# Patient Record
Sex: FEMALE | Race: WHITE | Employment: OTHER | URBAN - METROPOLITAN AREA
[De-identification: names, ages, dates, MRNs, and addresses within clinical notes are randomized per-mention and may not be internally consistent; named-entity substitution may affect disease eponyms.]

---

## 2022-03-08 ENCOUNTER — ANESTHESIA EVENT (OUTPATIENT)
Dept: ENDOSCOPY | Age: 82
End: 2022-03-08
Payer: MEDICARE

## 2022-03-09 ENCOUNTER — HOSPITAL ENCOUNTER (OUTPATIENT)
Age: 82
Setting detail: OUTPATIENT SURGERY
Discharge: HOME | End: 2022-03-09
Attending: INTERNAL MEDICINE | Admitting: INTERNAL MEDICINE
Payer: MEDICARE

## 2022-03-09 ENCOUNTER — ANESTHESIA (OUTPATIENT)
Dept: ENDOSCOPY | Age: 82
End: 2022-03-09
Payer: MEDICARE

## 2022-03-09 VITALS
TEMPERATURE: 97.7 F | RESPIRATION RATE: 16 BRPM | BODY MASS INDEX: 35.85 KG/M2 | DIASTOLIC BLOOD PRESSURE: 61 MMHG | HEART RATE: 83 BPM | HEIGHT: 64 IN | WEIGHT: 210 LBS | SYSTOLIC BLOOD PRESSURE: 101 MMHG | OXYGEN SATURATION: 95 %

## 2022-03-09 LAB
POTASSIUM BLD-SCNC: 6.3 MMOL/L (ref 3.5–5.1)
POTASSIUM SERPL-SCNC: 5.4 MMOL/L (ref 3.5–5.1)

## 2022-03-09 PROCEDURE — 84132 ASSAY OF SERUM POTASSIUM: CPT

## 2022-03-09 PROCEDURE — 74011000250 HC RX REV CODE- 250: Performed by: STUDENT IN AN ORGANIZED HEALTH CARE EDUCATION/TRAINING PROGRAM

## 2022-03-09 PROCEDURE — 2709999900 HC NON-CHARGEABLE SUPPLY: Performed by: INTERNAL MEDICINE

## 2022-03-09 PROCEDURE — 77030021593 HC FCPS BIOP ENDOSC BSC -A: Performed by: INTERNAL MEDICINE

## 2022-03-09 PROCEDURE — 74011250636 HC RX REV CODE- 250/636: Performed by: ANESTHESIOLOGY

## 2022-03-09 PROCEDURE — 76040000025: Performed by: INTERNAL MEDICINE

## 2022-03-09 PROCEDURE — 88312 SPECIAL STAINS GROUP 1: CPT

## 2022-03-09 PROCEDURE — 74011250636 HC RX REV CODE- 250/636: Performed by: STUDENT IN AN ORGANIZED HEALTH CARE EDUCATION/TRAINING PROGRAM

## 2022-03-09 PROCEDURE — 76060000031 HC ANESTHESIA FIRST 0.5 HR: Performed by: INTERNAL MEDICINE

## 2022-03-09 PROCEDURE — 88305 TISSUE EXAM BY PATHOLOGIST: CPT

## 2022-03-09 PROCEDURE — C1726 CATH, BAL DIL, NON-VASCULAR: HCPCS | Performed by: INTERNAL MEDICINE

## 2022-03-09 RX ORDER — SODIUM CHLORIDE, SODIUM LACTATE, POTASSIUM CHLORIDE, CALCIUM CHLORIDE 600; 310; 30; 20 MG/100ML; MG/100ML; MG/100ML; MG/100ML
100 INJECTION, SOLUTION INTRAVENOUS CONTINUOUS
Status: DISCONTINUED | OUTPATIENT
Start: 2022-03-09 | End: 2022-03-09 | Stop reason: HOSPADM

## 2022-03-09 RX ORDER — SODIUM CHLORIDE, SODIUM LACTATE, POTASSIUM CHLORIDE, CALCIUM CHLORIDE 600; 310; 30; 20 MG/100ML; MG/100ML; MG/100ML; MG/100ML
25 INJECTION, SOLUTION INTRAVENOUS CONTINUOUS
Status: DISCONTINUED | OUTPATIENT
Start: 2022-03-09 | End: 2022-03-09 | Stop reason: HOSPADM

## 2022-03-09 RX ORDER — LIDOCAINE HYDROCHLORIDE 20 MG/ML
INJECTION, SOLUTION EPIDURAL; INFILTRATION; INTRACAUDAL; PERINEURAL AS NEEDED
Status: DISCONTINUED | OUTPATIENT
Start: 2022-03-09 | End: 2022-03-09 | Stop reason: HOSPADM

## 2022-03-09 RX ORDER — ASPIRIN 81 MG/1
81 TABLET ORAL DAILY
COMMUNITY

## 2022-03-09 RX ORDER — PROPOFOL 10 MG/ML
INJECTION, EMULSION INTRAVENOUS AS NEEDED
Status: DISCONTINUED | OUTPATIENT
Start: 2022-03-09 | End: 2022-03-09 | Stop reason: HOSPADM

## 2022-03-09 RX ADMIN — SODIUM CHLORIDE, SODIUM LACTATE, POTASSIUM CHLORIDE, AND CALCIUM CHLORIDE: 600; 310; 30; 20 INJECTION, SOLUTION INTRAVENOUS at 11:32

## 2022-03-09 RX ADMIN — PROPOFOL 50 MG: 10 INJECTION, EMULSION INTRAVENOUS at 11:37

## 2022-03-09 RX ADMIN — PROPOFOL 20 MG: 10 INJECTION, EMULSION INTRAVENOUS at 11:43

## 2022-03-09 RX ADMIN — PROPOFOL 30 MG: 10 INJECTION, EMULSION INTRAVENOUS at 11:39

## 2022-03-09 RX ADMIN — LIDOCAINE HYDROCHLORIDE 100 MG: 20 INJECTION, SOLUTION EPIDURAL; INFILTRATION; INTRACAUDAL; PERINEURAL at 11:37

## 2022-03-09 NOTE — H&P
History and Physical for Procedure             Date: 3/9/2022     History of Present Illness:  Patient presents to undergo EGD. Past Medical History:   Diagnosis Date    A[facility]fib ([facility] [address]     Asthma     Hypertension     Stroke ([facility] [address]     TIA 2021, no residual      Past Surgical History:   Procedure Laterality Date    HX CHOLECYSTECTOMY      HX ORTHOPAEDIC      Broken ankle with hardware      In and  No family history on file. Social History     Tobacco Use    Smoking status: Never Smoker    Smokeless tobacco: Never Used   Substance Use Topics    Alcohol use: Not Currently        Allergies   Allergen Reactions    Pcn [Penicillins] Rash     No current facility-administered medications for this encounter. Current Outpatient Medications   Medication Sig    hydrALAZINE (APRESOLINE) 25 mg tablet Take 25 mg by mouth daily.  furosemide (Lasix) 20 mg tablet Take 20 mg by mouth daily.  spironolactone (ALDACTONE) 25 mg tablet Take 25 mg by mouth daily.  DULoxetine (Cymbalta) 60 mg capsule Take 60 mg by mouth daily.  apixaban (Eliquis) 5 mg tablet Take 5 mg by mouth two (2) times a day.  esomeprazole (NexIUM) 40 mg capsule Take  by mouth daily.  montelukast (Singulair) 10 mg tablet Take 10 mg by mouth daily.  levothyroxine (SYNTHROID) 112 mcg tablet Take  by mouth Daily (before breakfast).  metoprolol tartrate (LOPRESSOR) 25 mg tablet Take 25 mg by mouth two (2) times a day.  nitrofurantoin (Macrodantin) 100 mg capsule Take 50 mg by mouth two (2) times a day. Review of Systems:  A detailed 10 organ review of systems is obtained with pertinent positives as listed in the History of Present Illness. All others are negative. Objective:     Physical Exam:  There were no vitals taken for this visit. General:  Alert and oriented.   Heart: Regular rate and rhythm  Lungs:  Clear to auscultation bilaterally  Abdomen: Soft, nontender, nondistended    Impression/Plan:     Proceed with EGD as planned. I have discussed with the patient the technique, benefits, alternatives, and risks of these procedures, including medication reaction, immediate or delayed bleeding, or perforation of the gastrointestinal tract.      Signed By: Lucy New MD     March 9, 2022

## 2022-03-09 NOTE — PROGRESS NOTES
K+ on EPOC machine revealed a 6.3. Dr. Lurdes Lagunas notified and staff is to re draw a potassium and send to lab.

## 2022-03-09 NOTE — OP NOTES
Procedure:  Esophagogastroduodenoscopy    Date of Procedure:  3/9/2022    Patient:  Marjorie Escobar     1940    Indication:  Dysphagia, GERD     Sedation:  MAC    Pre-Procedure Physical Exam:    Mental status:  alert and oriented  Airway:  normal oropharyngeal airway and neck mobility  CV:  regular rate and rhythm  Respiratory:  clear to auscultation    Procedure:  A History and Physical has been performed, and patient medication allergies have been reviewed. Risks of perforation, hemorrhage, adverse drug reaction, and aspiration were discussed. Informed consent was obtained for the procedure, including sedation. The patient was placed in the left lateral decubitus position. The heart rate, oxygen saturations, blood pressure, and response to care were monitored throughout the procedure. The gastroscope was passed through the mouth and advanced under direct vision to the second portion of the duodenum. A careful inspection was made as the gastroscope was withdrawn, including a retroflexed view of the proximal stomach. The patient tolerated the procedure well. Findings:     OROPHARYNX: Cords and pyriform recesses appear normal.   ESOPHAGUS: Esophageal dyskinesia is seen. Empiric dilation was performed using a CRE dilator to maximum enter diameter of 20 mm. No evidence of mucosal disruption. STOMACH: On retroflexion, the flap-valve is classified as Grade IV, with no tissue fold present at the lesser curvature, an open esophagus, and significant axial displacement of the squamocolumnar junction. A sliding-type hiatal hernia is seen with axial height of 3 cm and approximate transverse width of 3 cm. Mild erythematous gastritis was seen in the antrum. Biopsies of the antrum and body were obtained for H pylori. DUODENUM: The bulb and second portions are normal.    Specimen:  Yes    Estimated Blood Loss:  Minimal    Implant:  None           Impression:    Esophageal dyskinesia.  Empiric dilation was performed. Hiatal hernia. Gastritis. Plan:  1. Soft diet today. 2. Advance diet tomorrow as tolerated. 3. Omeprazole 40 mg daily. 4. Avoid NSAIDs and hold anticoagulation for 48 hours. 5. Follow-up pathology results. 6. Return to office in 2-3 months.      Signed:  Karly Jimenez MD  3/9/2022  11:48 AM

## 2022-03-09 NOTE — ANESTHESIA PREPROCEDURE EVALUATION
Relevant Problems   No relevant active problems       Anesthetic History               Review of Systems / Medical History  Patient summary reviewed and pertinent labs reviewed    Pulmonary            Asthma        Neuro/Psych       CVA       Cardiovascular    Hypertension        Dysrhythmias : atrial fibrillation  CAD    Exercise tolerance: <4 METS     GI/Hepatic/Renal                Endo/Other        Obesity     Other Findings   Comments: Eliquis 3/7         Physical Exam    Airway  Mallampati: II  TM Distance: > 6 cm  Neck ROM: normal range of motion   Mouth opening: Normal     Cardiovascular    Rhythm: irregular  Rate: normal         Dental    Dentition: Full lower dentures and Full upper dentures     Pulmonary    Rhonchi:bibasilar             Abdominal         Other Findings            Anesthetic Plan    ASA: 3  Anesthesia type: total IV anesthesia            Anesthetic plan and risks discussed with: Patient      K pending

## 2022-03-09 NOTE — DISCHARGE INSTRUCTIONS
Gastrointestinal Esophagogastroduodenoscopy (EGD) - Upper Exam Discharge Instructions    1. Call Dr. Rosina Valenzuela at [phone] for any problems or questions. 2. Contact the doctor's office for follow up appointment as directed. 3. Medication may cause drowsiness for several hours, therefore:  · Do not drive or operate machinery for remainder of the day. · No alcohol today. · Do not make any important or legal decisions for 24 hours. · Do not sign any legal documents for 24 hours. 5. Ordinarily, you may resume regular diet and activity after exam unless otherwise specified by your physician. 6. For mild soreness in your throat you may use Cepacol throat lozenges or warm salt-water gargles as needed.     Any additional instructions:  Dr. Shirley Qiu office will call with pathology results and follow up appointment  May resume Eliquis Friday 3/11/22

## 2022-03-09 NOTE — ANESTHESIA POSTPROCEDURE EVALUATION
Procedure(s):  ESOPHAGOGASTRODUODENOSCOPY (EGD)/ 38  ESOPHAGEAL DILATION  ESOPHAGOGASTRODUODENAL (EGD) BIOPSY.     total IV anesthesia    Anesthesia Post Evaluation      Multimodal analgesia: multimodal analgesia not used between 6 hours prior to anesthesia start to PACU discharge  Patient location during evaluation: PACU  Patient participation: complete - patient participated  Level of consciousness: awake and alert  Pain management: adequate  Airway patency: patent  Anesthetic complications: no  Cardiovascular status: hemodynamically stable  Respiratory status: acceptable  Hydration status: acceptable        INITIAL Post-op Vital signs:   Vitals Value Taken Time   /61 03/09/22 1231   Temp 36.5 °C (97.7 °F) 03/09/22 1151   Pulse 83 03/09/22 1231   Resp 16 03/09/22 1231   SpO2 95 % 03/09/22 1231

## 2022-04-10 ENCOUNTER — HOSPITAL ENCOUNTER (EMERGENCY)
Age: 82
Discharge: HOME | End: 2022-04-10
Attending: EMERGENCY MEDICINE
Payer: MEDICARE

## 2022-04-10 VITALS
SYSTOLIC BLOOD PRESSURE: 159 MMHG | RESPIRATION RATE: 19 BRPM | DIASTOLIC BLOOD PRESSURE: 91 MMHG | OXYGEN SATURATION: 91 % | TEMPERATURE: 98.4 F | HEART RATE: 94 BPM

## 2022-04-10 DIAGNOSIS — I10 HYPERTENSION, UNSPECIFIED TYPE: ICD-10-CM

## 2022-04-10 DIAGNOSIS — R04.0 EPISTAXIS: Primary | ICD-10-CM

## 2022-04-10 LAB
ALBUMIN SERPL-MCNC: 3.5 G/DL (ref 3.2–4.6)
ALBUMIN/GLOB SERPL: 1.1 {RATIO} (ref 1.2–3.5)
ALP SERPL-CCNC: 70 U/L (ref 50–136)
ALT SERPL-CCNC: 35 U/L (ref 12–65)
ANION GAP SERPL CALC-SCNC: 6 MMOL/L (ref 7–16)
AST SERPL-CCNC: 18 U/L (ref 15–37)
BASOPHILS # BLD: 0 K/UL (ref 0–0.2)
BASOPHILS NFR BLD: 1 % (ref 0–2)
BILIRUB SERPL-MCNC: 0.5 MG/DL (ref 0.2–1.1)
BUN SERPL-MCNC: 17 MG/DL (ref 8–23)
CALCIUM SERPL-MCNC: 8.9 MG/DL (ref 8.3–10.4)
CHLORIDE SERPL-SCNC: 100 MMOL/L (ref 98–107)
CO2 SERPL-SCNC: 30 MMOL/L (ref 21–32)
CREAT SERPL-MCNC: 0.9 MG/DL (ref 0.6–1)
DIFFERENTIAL METHOD BLD: ABNORMAL
EOSINOPHIL # BLD: 0.1 K/UL (ref 0–0.8)
EOSINOPHIL NFR BLD: 2 % (ref 0.5–7.8)
ERYTHROCYTE [DISTWIDTH] IN BLOOD BY AUTOMATED COUNT: 15.2 % (ref 11.9–14.6)
GLOBULIN SER CALC-MCNC: 3.1 G/DL (ref 2.3–3.5)
GLUCOSE SERPL-MCNC: 103 MG/DL (ref 65–100)
HCT VFR BLD AUTO: 41.4 % (ref 35.8–46.3)
HGB BLD-MCNC: 13.1 G/DL (ref 11.7–15.4)
IMM GRANULOCYTES # BLD AUTO: 0 K/UL (ref 0–0.5)
IMM GRANULOCYTES NFR BLD AUTO: 0 % (ref 0–5)
LYMPHOCYTES # BLD: 1.7 K/UL (ref 0.5–4.6)
LYMPHOCYTES NFR BLD: 35 % (ref 13–44)
MCH RBC QN AUTO: 29.1 PG (ref 26.1–32.9)
MCHC RBC AUTO-ENTMCNC: 31.6 G/DL (ref 31.4–35)
MCV RBC AUTO: 92 FL (ref 79.6–97.8)
MONOCYTES # BLD: 0.4 K/UL (ref 0.1–1.3)
MONOCYTES NFR BLD: 9 % (ref 4–12)
NEUTS SEG # BLD: 2.6 K/UL (ref 1.7–8.2)
NEUTS SEG NFR BLD: 53 % (ref 43–78)
NRBC # BLD: 0 K/UL (ref 0–0.2)
PLATELET # BLD AUTO: 254 K/UL (ref 150–450)
PMV BLD AUTO: 11 FL (ref 9.4–12.3)
POTASSIUM SERPL-SCNC: 4.4 MMOL/L (ref 3.5–5.1)
PROT SERPL-MCNC: 6.6 G/DL (ref 6.3–8.2)
RBC # BLD AUTO: 4.5 M/UL (ref 4.05–5.2)
SODIUM SERPL-SCNC: 136 MMOL/L (ref 136–145)
WBC # BLD AUTO: 4.8 K/UL (ref 4.3–11.1)

## 2022-04-10 PROCEDURE — 99283 EMERGENCY DEPT VISIT LOW MDM: CPT

## 2022-04-10 PROCEDURE — 74011000250 HC RX REV CODE- 250: Performed by: EMERGENCY MEDICINE

## 2022-04-10 PROCEDURE — 85025 COMPLETE CBC W/AUTO DIFF WBC: CPT

## 2022-04-10 PROCEDURE — 30901 CONTROL OF NOSEBLEED: CPT

## 2022-04-10 PROCEDURE — 80053 COMPREHEN METABOLIC PANEL: CPT

## 2022-04-10 RX ADMIN — Medication 2 ML: at 20:41

## 2022-04-10 NOTE — DISCHARGE INSTRUCTIONS
You will need to have the nasal packing removed and 2 days. Call Monday morning to schedule follow-up appointment with the ENT on Wednesday.

## 2022-04-10 NOTE — ED PROVIDER NOTES
69-year-old female presenting to the emergency department today secondary to a nosebleed which started earlier this afternoon. The patient states she is never had a bleed in her nose before. Her blood pressure was noted to be elevated. She does take blood thinners secondary to a history of atrial fibrillation. She denies any trauma or injury to the nose. Past Medical History:   Diagnosis Date    [facility] [address]     Asthma     Hypertension     [facility]     TIA 2021, no residual        Past Surgical History:   Procedure Laterality Date    HX CHOLECYSTECTOMY      HX ORTHOPAEDIC      Broken ankle with hardware          No family history on file. Social History     Socioeconomic History    Marital status:      Spouse name: Not on file    Number of children: Not on file    Years of education: Not on file    Highest education level: Not on file   Occupational History    Not on file   Tobacco Use    Smoking status: Never Smoker    Smokeless tobacco: Never Used   Substance and Sexual Activity    Alcohol use: Not Currently    Drug use: Not on file    Sexual activity: Not on file   Other Topics Concern    Not on file   Social History Narrative    Not on file     Social Determinants of Health     Financial Resource Strain:     Difficulty of Paying Living Expenses: Not on file   Food Insecurity:     Worried About Running Out of Food in the Last Year: Not on file    Shell of Food in the Last Year: Not on file   Transportation Needs:     Lack of Transportation (Medical): Not on file    Lack of Transportation (Non-Medical):  Not on file   Physical Activity:     Days of Exercise per Week: Not on file    Minutes of Exercise per Session: Not on file   Stress:     Feeling of Stress : Not on file   Social Connections:     Frequency of Communication with Friends and Family: Not on file    Frequency of Social Gatherings with Friends and Family: Not on file    Attends Congregational Services: Not on file    Active Member of Clubs or Organizations: Not on file    Attends Club or Organization Meetings: Not on file    Marital Status: Not on file   Intimate Partner Violence:     Fear of Current or Ex-Partner: Not on file    Emotionally Abused: Not on file    Physically Abused: Not on file    Sexually Abused: Not on file   Housing Stability:     Unable to Pay for Housing in the Last Year: Not on file    Number of [place] in the Last Year: Not on file    Unstable Housing in the Last Year: Not on file         ALLERGIES: Pcn [penicillins]    Review of Systems   Constitutional: Negative. HENT: Positive for nosebleeds. Musculoskeletal: Negative. Skin: Negative. Hematological: Bruises/bleeds easily. Vitals:    04/10/22 1817 04/10/22 1834 04/10/22 1835 04/10/22 1932   BP: (!) 153/94   (!) 159/91   Pulse:   (!) 101 (!) 109   Resp:   28 24   Temp:       SpO2:  95% 95% 91%            Physical Exam     GENERAL:The patient has There is no height or weight on file to calculate BMI. Well-hydrated. VITAL SIGNS: Heart rate, blood pressure, respiratory rate reviewed as recorded in  nurse's notes  EYES: Pupils reactive. Extraocular motion intact. No conjunctival redness or drainage. EARS: No external masses or lesions. NOSE: Active epistaxis from the right naris. No evidence of trauma or nasal deviation appreciated. MOUTH/THROAT: Pharynx clear; airway patent. Blood in the posterior pharynx. NECK: Supple, no meningeal signs. Trachea midline. No masses or thyromegaly. LUNGS: Breath sounds clear and equal bilaterally no accessory muscle use. CHEST: No deformity  CARDIOVASCULAR: Regular rate and rhythm  EXTREMITIES: No clubbing or cyanosis. No joint swelling. Normal muscle tone. No  restricted range of motion appreciated. NEUROLOGIC: Sensation is grossly intact. Cranial nerve exam reveals face is  symmetrical, tongue is midline speech is clear. SKIN: No rash or petechiae. Good skin turgor palpated. PSYCHIATRIC: Alert and oriented. Appropriate behavior and judgment. MDM  Number of Diagnoses or Management Options  Diagnosis management comments: Hypertension, epistaxis, adverse medication reaction,       Amount and/or Complexity of Data Reviewed  Clinical lab tests: ordered and reviewed  Tests in the medicine section of CPT®: ordered and reviewed  Review and summarize past medical records: yes      ED Course as of 04/10/22 2053   Sun Apr 10, 2022   1956 Patient has not had any further epistaxis after packing with Surgicel was performed. I talked to her daughter and she will be discharged. We talked about not blowing her nose and just dabbing at it and she will be sent home with a nasal clamp just in case it starts bleeding again [KH]      ED Course User Index  [KH] Elena Jaramillo DO       Epistaxis Management    Date/Time: 4/10/2022 7:56 PM  Performed by: Elena Jaramillo DO  Authorized by: Elena Jaramillo DO     Consent:     Consent obtained:  Verbal    Consent given by:  Patient    Risks discussed:  Bleeding, nasal injury, pain and infection    Alternatives discussed:  No treatment, observation and alternative treatment  Anesthesia (see MAR for exact dosages): Anesthesia method:  None  Procedure details:     Treatment site:  R anterior    Treatment method: Thrombin    Treatment complexity:  Limited  Post-procedure details:     Assessment:  Bleeding stopped    Epistaxis Management    Date/Time: 4/10/2022 8:52 PM  Performed by: Elena Jaramillo DO  Authorized by: Elena Jaramillo DO     Consent:     Consent obtained:  Verbal    Consent given by:  Patient    Risks discussed:  Bleeding, infection, nasal injury and pain    Alternatives discussed:  Observation  Anesthesia (see MAR for exact dosages):      Anesthesia method:  Topical application    Topical anesthetic:  Lidocaine gel  Procedure details:     Treatment site:  R anterior    Treatment method:  Nasal balloon    Treatment complexity:  Limited    Treatment episode: initial    Post-procedure details:     Assessment:  Bleeding stopped    Patient tolerance of procedure: Tolerated well, no immediate complications  Comments:      Just prior to the patient's discharge back to her facility her nose started bleeding once again. At that point time it was deemed a failure of the initial Surgicel packing and this was removed. The 5.5 cm rapid Rhino was inserted and patient tolerated procedure well.

## 2022-04-10 NOTE — ED NOTES
Patient alert, no bleeding noted from nares at this time, patient spitting some blood sputum into emesis bag.  Patient now alert and oriented x3

## 2022-04-10 NOTE — ED TRIAGE NOTES
Patient arrives to ED via EMS from [address] living on [address] [facility]. Patient reports nose bleed that started at 3. Patient reports the nose bleed stopped but then started again at 1700. When EMS arrived patient was hypertensive and altered. Patient is only alert to herself. Per staff she is normally oriented. Patient is on Eliquis. Cyndee Taylor MD to evaluate patient.      In route:   4 mg Zofran given in route

## 2022-04-11 ENCOUNTER — HOSPITAL ENCOUNTER (EMERGENCY)
Age: 82
Discharge: HOME | End: 2022-04-11
Attending: EMERGENCY MEDICINE
Payer: MEDICARE

## 2022-04-11 VITALS
BODY MASS INDEX: 34.15 KG/M2 | HEART RATE: 92 BPM | DIASTOLIC BLOOD PRESSURE: 113 MMHG | WEIGHT: 200 LBS | RESPIRATION RATE: 16 BRPM | HEIGHT: 64 IN | SYSTOLIC BLOOD PRESSURE: 128 MMHG | OXYGEN SATURATION: 95 % | TEMPERATURE: 98.2 F

## 2022-04-11 DIAGNOSIS — R04.0 EPISTAXIS: Primary | ICD-10-CM

## 2022-04-11 PROCEDURE — 30901 CONTROL OF NOSEBLEED: CPT

## 2022-04-11 PROCEDURE — 99283 EMERGENCY DEPT VISIT LOW MDM: CPT

## 2022-04-11 PROCEDURE — 74011000250 HC RX REV CODE- 250: Performed by: EMERGENCY MEDICINE

## 2022-04-11 PROCEDURE — 74011250637 HC RX REV CODE- 250/637: Performed by: EMERGENCY MEDICINE

## 2022-04-11 RX ORDER — SILVER NITRATE 38.21; 12.74 MG/1; MG/1
2 STICK TOPICAL ONCE
Status: COMPLETED | OUTPATIENT
Start: 2022-04-11 | End: 2022-04-11

## 2022-04-11 RX ORDER — LIDOCAINE HYDROCHLORIDE 20 MG/ML
15 SOLUTION OROPHARYNGEAL
Status: COMPLETED | OUTPATIENT
Start: 2022-04-11 | End: 2022-04-11

## 2022-04-11 RX ORDER — OXYMETAZOLINE HCL 0.05 %
2 SPRAY, NON-AEROSOL (ML) NASAL
Status: COMPLETED | OUTPATIENT
Start: 2022-04-11 | End: 2022-04-11

## 2022-04-11 RX ADMIN — OXYMETAZOLINE HCL 2 SPRAY: 0.05 SPRAY NASAL at 16:35

## 2022-04-11 RX ADMIN — Medication 15 ML: at 17:30

## 2022-04-11 RX ADMIN — Medication 2 APPLICATOR: at 17:30

## 2022-04-11 NOTE — ED NOTES
notified patients daughter Vincent Neither of change in plan of care with rhino rocket and ent follow up provided.

## 2022-04-11 NOTE — ED TRIAGE NOTES
[facility]. Patient seen here yesterday for nosebleed. Esophageal stretching 3 days ago. Pt oozing from both nares per EMS. VSS. A&O x4. EMS states patient smells of possible UTI.

## 2022-04-11 NOTE — ED NOTES
I have reviewed discharge instructions with the patient and caregiver. The patient and caregiver verbalized understanding. Patient left ED via Discharge Method: wheelchair to Home with (insert name of family/friend, self, transportdaughter). Opportunity for questions and clarification provided. Patient given 0 scripts. To continue your aftercare when you leave the hospital, you may receive an automated call from our care team to check in on how you are doing. This is a free service and part of our promise to provide the best care and service to meet your aftercare needs.  If you have questions, or wish to unsubscribe from this service please call [phone]. Thank you for Choosing our [facility] Emergency Department.

## 2022-04-11 NOTE — ED NOTES
I have reviewed discharge instructions with the patient. The caregiver verbalized understanding. Patient left ED via Discharge Method: ambulatory to Home with safe ride -round trip     Opportunity for questions and clarification provided. Patient given0 scripts. To continue your aftercare when you leave the hospital, you may receive an automated call from our care team to check in on how you are doing. This is a free service and part of our promise to provide the best care and service to meet your aftercare needs.  If you have questions, or wish to unsubscribe from this service please call [phone]. Thank you for Choosing our [facility] Emergency Department.

## 2022-04-11 NOTE — ED NOTES
This RN at bedside for discharge. Patient has trickle of blood down out of right nostril.  Provider horn aware and will come evaluate prior to discharge

## 2022-04-12 NOTE — ED PROVIDER NOTES
Daughter and records provide most of history. Nose bleed yesterday. Seen here and rinorocket placed. Somehow it came out and she had recurrent bleeding today. She is on Eliquis. She has had prior nose bleeds. A month ago she was using Albuterol nebulizer and had nosebleed. No recent URI. BP has been high. No chest pain or SOB. Past Medical History:   Diagnosis Date    [facility] [address]     Asthma     Hypertension     [facility]     TIA 2021, no residual        Past Surgical History:   Procedure Laterality Date    HX CHOLECYSTECTOMY      HX ORTHOPAEDIC      Broken ankle with hardware          No family history on file. Social History     Socioeconomic History    Marital status:      Spouse name: Not on file    Number of children: Not on file    Years of education: Not on file    Highest education level: Not on file   Occupational History    Not on file   Tobacco Use    Smoking status: Never Smoker    Smokeless tobacco: Never Used   Substance and Sexual Activity    Alcohol use: Not Currently    Drug use: Not on file    Sexual activity: Not on file   Other Topics Concern    Not on file   Social History Narrative    Not on file     Social Determinants of Health     Financial Resource Strain:     Difficulty of Paying Living Expenses: Not on file   Food Insecurity:     Worried About Running Out of Food in the Last Year: Not on file    Shell of Food in the Last Year: Not on file   Transportation Needs:     Lack of Transportation (Medical): Not on file    Lack of Transportation (Non-Medical):  Not on file   Physical Activity:     Days of Exercise per Week: Not on file    Minutes of Exercise per Session: Not on file   Stress:     Feeling of Stress : Not on file   Social Connections:     Frequency of Communication with Friends and Family: Not on file    Frequency of Social Gatherings with Friends and Family: Not on file    Attends Mormonism Services: Not on file    Active Member of Clubs or Organizations: Not on file    Attends Club or Organization Meetings: Not on file    Marital Status: Not on file   Intimate Partner Violence:     Fear of Current or Ex-Partner: Not on file    Emotionally Abused: Not on file    Physically Abused: Not on file    Sexually Abused: Not on file   Housing Stability:     Unable to Pay for Housing in the Last Year: Not on file    Number of [place] in the Last Year: Not on file    Unstable Housing in the Last Year: Not on file         ALLERGIES: Pcn [penicillins]    Review of Systems   Constitutional: Negative. HENT: Positive for nosebleeds. Negative for congestion and rhinorrhea. Respiratory: Negative. Cardiovascular: Negative. Hematological: Bruises/bleeds easily. Eliquis       Vitals:    04/11/22 1228 04/11/22 1647 04/11/22 1747   BP: (!) 122/98 (!) 128/113    Pulse: 92     Resp: 16     Temp: 98.2 °F (36.8 °C)     SpO2: 97% 91% 95%   Weight: 90.7 kg (200 lb)     Height: 5' 4\" (1.626 m)              Physical Exam  Vitals and nursing note reviewed. HENT:      Head: Normocephalic. Nose:      Comments: Blood in the right nostril. None in the left. Mouth/Throat:      Comments: Blood in posterior pharynx. Neurological:      Mental Status: She is alert. MDM  Number of Diagnoses or Management Options  Epistaxis  Diagnosis management comments: Epistaxis on Eliquis  Controlled with pressure, Afrin, lidocaine, silver nitrate  Follow up with ENT  Needs to establish PCP and Cardiologist here  Return with new or worse symptoms.        Amount and/or Complexity of Data Reviewed  Decide to obtain previous medical records or to obtain history from someone other than the patient: yes  Obtain history from someone other than the patient: yes (daughter)    Patient Progress  Patient progress: improved         Epistaxis Management    Date/Time: 4/11/2022 10:38 PM  Performed by: Shane Carrasquillo MD  Authorized by: Shane Carrasquillo MD Consent:     Consent obtained:  Verbal    Consent given by: daughter. Anesthesia (see MAR for exact dosages): Anesthesia method:  Topical application    Topical anesthetic:  Lidocaine gel  Procedure details:     Treatment site:  R septum    Treatment method:  Silver nitrate    Treatment complexity:  Limited  Post-procedure details:     Assessment:  Bleeding stopped    Patient tolerance of procedure:   Tolerated well, no immediate complications  Comments:      Posterior pharynx with no further blood

## 2022-06-17 ENCOUNTER — INITIAL CONSULT (OUTPATIENT)
Dept: CARDIOLOGY CLINIC | Age: 82
End: 2022-06-17
Payer: MEDICARE

## 2022-06-17 VITALS
DIASTOLIC BLOOD PRESSURE: 70 MMHG | HEIGHT: 64 IN | BODY MASS INDEX: 35.85 KG/M2 | HEART RATE: 75 BPM | SYSTOLIC BLOOD PRESSURE: 110 MMHG | WEIGHT: 210 LBS

## 2022-06-17 DIAGNOSIS — Z86.73 H/O: STROKE: ICD-10-CM

## 2022-06-17 DIAGNOSIS — Z79.01 CHRONIC ANTICOAGULATION: ICD-10-CM

## 2022-06-17 DIAGNOSIS — R60.0 BILATERAL LOWER EXTREMITY EDEMA: ICD-10-CM

## 2022-06-17 DIAGNOSIS — I50.32 CHRONIC DIASTOLIC CONGESTIVE HEART FAILURE (HCC): ICD-10-CM

## 2022-06-17 DIAGNOSIS — I10 BENIGN ESSENTIAL HTN: ICD-10-CM

## 2022-06-17 DIAGNOSIS — I48.21 ATRIAL FIBRILLATION, PERMANENT (HCC): Primary | ICD-10-CM

## 2022-06-17 DIAGNOSIS — Z76.89 ENCOUNTER TO ESTABLISH CARE: ICD-10-CM

## 2022-06-17 PROBLEM — E03.9 ACQUIRED HYPOTHYROIDISM: Status: ACTIVE | Noted: 2021-02-03

## 2022-06-17 PROBLEM — I50.30 DIASTOLIC CONGESTIVE HEART FAILURE (HCC): Status: ACTIVE | Noted: 2022-06-17

## 2022-06-17 PROCEDURE — 1123F ACP DISCUSS/DSCN MKR DOCD: CPT | Performed by: INTERNAL MEDICINE

## 2022-06-17 PROCEDURE — 1036F TOBACCO NON-USER: CPT | Performed by: INTERNAL MEDICINE

## 2022-06-17 PROCEDURE — 99205 OFFICE O/P NEW HI 60 MIN: CPT | Performed by: INTERNAL MEDICINE

## 2022-06-17 PROCEDURE — 93000 ELECTROCARDIOGRAM COMPLETE: CPT | Performed by: INTERNAL MEDICINE

## 2022-06-17 PROCEDURE — G8400 PT W/DXA NO RESULTS DOC: HCPCS | Performed by: INTERNAL MEDICINE

## 2022-06-17 PROCEDURE — 1090F PRES/ABSN URINE INCON ASSESS: CPT | Performed by: INTERNAL MEDICINE

## 2022-06-17 PROCEDURE — G8417 CALC BMI ABV UP PARAM F/U: HCPCS | Performed by: INTERNAL MEDICINE

## 2022-06-17 PROCEDURE — G8428 CUR MEDS NOT DOCUMENT: HCPCS | Performed by: INTERNAL MEDICINE

## 2022-06-17 RX ORDER — MEMANTINE HYDROCHLORIDE 5 MG/1
TABLET ORAL
COMMUNITY
Start: 2022-03-24

## 2022-06-17 RX ORDER — TEMAZEPAM 7.5 MG/1
CAPSULE ORAL
COMMUNITY
Start: 2022-01-20

## 2022-06-17 ASSESSMENT — ENCOUNTER SYMPTOMS
DOUBLE VISION: 0
WHEEZING: 0
STRIDOR: 0
ABDOMINAL PAIN: 0
EYE REDNESS: 0
HOARSE VOICE: 0
HEMOPTYSIS: 0
HEMATEMESIS: 0
HEMATOCHEZIA: 0

## 2022-06-17 NOTE — PATIENT INSTRUCTIONS
- Very important to keep an eye on your weight daily and if you gain over 2 pounds or 3 pounds overnight-take additional Lasix pills once daily until your weight is back to baseline.  -Continue to follow a low-salt diet and stay as active as possible.

## 2022-06-17 NOTE — PROGRESS NOTES
[facility] CARDIOLOGY  [facility], [address]  PHONE: [phone]          22    NAME:  Roger Hernandez  : 1940  MRN: [id number]         SUBJECTIVE:   Roger Hernandez is a 79 y.o. female seen for a visit regarding the following:     Chief Complaint   Patient presents with    Irregular Heart Beat    Consultation    Hypertension           HPI:    Cardio problem list:  1. Atrial fibrillation  -Chronic anticoagulation with Eliquis  -Has had epistaxis  2. Congestive heart failure  3. Hypertension  4. Hyperlipidemia  5. Abdominal aortic aneurysm  6. Hypothyroidism  Previous cardiologist from [facility]- Dr Janae Carr    Dear Lacie Oppenheim,   I saw Ms. Shasha Brown who is a pleasant 79-year-old woman in cardiovascular consultation on 2022 for atrial fibrillation, congestive heart failure, hypertension, hyperlipidemia, abdominal aortic aneurysm. She has moved here and relocated from the [facility]. She is to see Dr. Natalie Acosta with [facility] and we will try and track down records from them. She has permanent atrial fibrillation but is appropriately rate controlled with metoprolol and anticoagulate with Eliquis for thromboembolic prophylaxis. No complaints of any significant palpitations or presyncope or syncope or any TIAs or strokelike symptoms. Congestive heart failure-has chronic dyspnea on exertion-NYHA class II at the most and some chronic lower extremity edema-right greater than left but is on a combination of spironolactone and Lasix which is helped her and she does well with that. Denies any worsening dyspnea on exertion lately. Hypertension: Denies headaches or blurry vision and remains compliant with her current therapy. Hyperlipidemia: Diet controlled. Past Medical History, Past Surgical History, Family history, Social History, and Medications were all reviewed with the patient today and updated as necessary. Allergies   Allergen Reactions    Other Other (See Comments)     Granddaughter reports pt is allergic to shellfish    Penicillins Rash     Patient Active Problem List   Diagnosis    Acquired hypothyroidism    Benign essential HTN    Atrial fibrillation, permanent ([facility]    H/O: stroke    Chronic anticoagulation    Bilateral lower extremity edema    Diastolic congestive heart failure ([facility]     Past Medical History:   Diagnosis Date    A-fib ([facility]     Asthma     Hypertension     Stroke ([facility]     TIA , no residual      Past Surgical History:   Procedure Laterality Date    CHOLECYSTECTOMY      ORTHOPEDIC SURGERY      Broken ankle with hardware      No family history on file. Social History     Tobacco Use    Smoking status: Former Smoker     Start date:      Quit date:      Years since quittin.4    Smokeless tobacco: Never Used   Substance Use Topics    Alcohol use: Not Currently     Current Outpatient Medications   Medication Sig Dispense Refill    memantine (NAMENDA) 5 MG tablet       temazepam (RESTORIL) 7.5 MG capsule       apixaban (ELIQUIS) 5 MG TABS tablet Take 5 mg by mouth 2 times daily      aspirin 81 MG EC tablet Take 81 mg by mouth daily      DULoxetine (CYMBALTA) 60 MG extended release capsule Take 60 mg by mouth daily      esomeprazole (NEXIUM) 40 MG delayed release capsule Take by mouth daily      furosemide (LASIX) 20 MG tablet Take 20 mg by mouth daily      levothyroxine (SYNTHROID) 112 MCG tablet Take 88 mcg by mouth every morning (before breakfast)       metoprolol tartrate (LOPRESSOR) 25 MG tablet Take 25 mg by mouth 2 times daily      montelukast (SINGULAIR) 10 MG tablet Take 10 mg by mouth daily      nitrofurantoin (MACRODANTIN) 100 MG capsule Take 50 mg by mouth 2 times daily      spironolactone (ALDACTONE) 25 MG tablet Take 25 mg by mouth daily       No current facility-administered medications for this visit.        Review of Systems Constitutional: Negative for chills and fever. HENT: Negative for ear discharge, hoarse voice and stridor. Eyes: Negative for double vision and redness. Cardiovascular: Positive for dyspnea on exertion and leg swelling. Negative for cyanosis and syncope. Respiratory: Negative for hemoptysis and wheezing. Endocrine: Negative for polydipsia and polyphagia. Hematologic/Lymphatic: Negative for adenopathy. Skin: Negative for itching and rash. Musculoskeletal: Negative for joint swelling and muscle weakness. Gastrointestinal: Negative for abdominal pain, hematemesis and hematochezia. Genitourinary: Negative for flank pain and nocturia. Neurological: Negative for focal weakness and seizures. Psychiatric/Behavioral: Negative for altered mental status and suicidal ideas. Allergic/Immunologic: Negative for hives. PHYSICAL EXAM:    /70   Pulse 75   Ht 5' 4\" (1.626 m)   Wt 210 lb (95.3 kg)   BMI 36.05 kg/m²      Physical Exam    General: Alert and oriented in no acute distress  HEENT: Head is normocephalic, atraumatic, pupils are equal bilaterally, throat appears to be clear  Neck: No significant jugular venous distention no cervical bruits  Cardiovascular: S1 and S2 heard, regular rate and rhythm, no significant murmurs rubs or gallops. Respiratory: Clear to auscultation bilaterally with no adventitious sounds, respirations are normal  Abdomen: Soft, nontender, nondistended, bowel sounds present. Extremities: No cyanosis clubbing or edema  Peripheral pulses: Bilateral radial artery pulses are palpated. Bilateral pedal pulses are well felt. Neuro: No facial droop and no gross focal motor deficits  Lymphatic: No significant cervical lymphadenopathy noted. Musculoskeletal: No significant redness or swelling noted in all exposed joints. Skin: No significant rashes noted the of the exposed regions. Medical problems and test results were reviewed with the patient today. No results found for this or any previous visit (from the past 672 hour(s)). No results found for: CHOL, CHOLPOCT, CHOLX, CHLST, CHOLV, HDL, HDLPOC, HDLC, LDL, LDLC, VLDLC, VLDL, TGLX, TRIGL,hemoglobin, basic metabolic panel, No results found for: TSH, TSH2, TSH3 ,  Lab Results   Component Value Date     04/10/2022    K 4.4 04/10/2022     04/10/2022    CO2 30 04/10/2022    BUN 17 04/10/2022    GFRAA >60 04/10/2022    GLOB 3.1 04/10/2022    ALT 35 04/10/2022    AST 18 04/10/2022      No results found for: LDLCALC, LDLCHOLESTEROL, LDLDIRECT     No results found for this or any previous visit. EKG done 6/17/2022 showed atrial fibrillation with controlled ventricular response but no other concerning findings at baseline. ASSESSMENT and PLAN      Atrial fibrillation, permanent (HCC)  -     EKG 12 lead  -     Transthoracic echocardiogram (TTE) complete with contrast, bubble, strain, and 3D PRN; Future  -This is not new for her. We will try and track down records from her previous cardiologist.  Benign essential HTN  -Well-controlled on current therapy-continue. No changes for now. Tolerating it well with no significant adverse effects. H/O: stroke  -Ambulates with a walker. No obvious gross focal motor or neurological deficits. Remains on Eliquis for thromboembolic prophylaxis    Chronic anticoagulation  -Remains on Eliquis for thromboembolic prophylaxis with no significant bleeding or bruising. Bilateral lower extremity edema  -     Transthoracic echocardiogram (TTE) complete with contrast, bubble, strain, and 3D PRN; Future    Encounter to establish care  -     EKG 12 lead  -New to me. We will continue to watch her carefully. Chronic diastolic congestive heart failure (HCC)  -Fairly euvolemic on exam.  Continue Lasix and spironolactone at current dosing.   Counseled on the importance of following a low-sodium diet and to weigh herself daily and if she gained over 2 pounds overnight or 5 pounds gradually over 1 week, she will take additional Lasix daily until her weight is back to baseline. Overall Impression  -She appears to be doing well from a cardiac perspective overall. Fairly euvolemic on exam apart from trace bilateral pitting ankle edema. Continue Lasix and spironolactone at current dosing. With regards to her atrial fibrillation, she has permanent A. fib but is appropriately rate controlled with metoprolol and anticoagulated with Eliquis for thromboembolic prophylaxis. She will be due for an echocardiogram as we have no study available to review LV function-both systolic and diastolic, dimensions of atria, rule out any additional valvular heart disease/structural heart disease. I will see her in follow-up in about 6 months time but of course we will see her sooner if needed. Return in about 6 months (around 12/17/2022) for afib, chf.     Thank you Reynaldo Maximeluigi for allowing us to participate in the care of your patient. If you have any further questions, please do not hesitate to contact us.   Sincerely,        Isa Pizano MD   6/17/2022

## 2022-07-25 ENCOUNTER — TELEPHONE (OUTPATIENT)
Dept: CARDIOLOGY CLINIC | Age: 82
End: 2022-07-25

## 2022-07-25 NOTE — TELEPHONE ENCOUNTER
Her rollator needs to be replaced. Could we send an order over to their local DME for a new one? Please send latest visit note with rx with supporting dx codes along with height and weight and insurance info. CHF and lwr ext edema should qualify. She needs to  the rollator today or tomorrow. Please fax URGENT to [address] in [facility] at [phone]. Their phone is [phone].

## 2022-07-25 NOTE — TELEPHONE ENCOUNTER
Spoke with patient's daughter, Sae Moody. Advised her that Dr. Lala Garcia does not write for this and recommended she call her PCP. Daughter verbalized understanding and will call PCP.

## 2022-07-26 ENCOUNTER — TELEPHONE (OUTPATIENT)
Dept: CARDIOLOGY CLINIC | Age: 82
End: 2022-07-26

## 2022-07-26 NOTE — TELEPHONE ENCOUNTER
----- Message from Garry Garrison MD sent at 7/26/2022  1:45 PM EDT -----  Please call patient and let her know that we reviewed her echocardiogram and it came back with normal pumping ability of the heart with no significant valve disease. The left upper chamber of the heart is dilated as expected with atrial fibrillation. We can follow this over time but it is not concerning. We will see her in follow-up as scheduled in December unless she needs to see us sooner.   Thanks,  AD

## (undated) DEVICE — CONTAINER PREFIL FRMLN 40ML --

## (undated) DEVICE — BLOCK BITE AD 60FR W/ VELC STRP ADDRESSES MOST PT AND

## (undated) DEVICE — ESOPHAGEAL BALLOON DILATATION CATHETER: Brand: CRE FIXED WIRE

## (undated) DEVICE — KENDALL RADIOLUCENT FOAM MONITORING ELECTRODE RECTANGULAR SHAPE: Brand: KENDALL

## (undated) DEVICE — ADULT SPO2 SENSOR: Brand: NELLCOR

## (undated) DEVICE — CANNULA NSL ORAL AD FOR CAPNOFLEX CO2 O2 AIRLFE

## (undated) DEVICE — CONNECTOR TBNG OD5-7MM O2 END DISP

## (undated) DEVICE — FORCEPS BX L240CM JAW DIA2.8MM L CAP W/ NDL MIC MESH TOOTH